# Patient Record
(demographics unavailable — no encounter records)

---

## 2025-06-04 NOTE — HISTORY OF PRESENT ILLNESS
[Currently Active] : currently active [Men] : men [TextBox_4] : Verónica is a 20 y/o G0 who presents today for an annual exam with request for HARRIET refill She has been taking birth control pills x1 month but has not had a menses. LMP was 4/25/25. She is using Gabby with levonogestral and EE x21 days with no placebo pills or pill free weeks. She states these were prescribed by someone in [facility].  she denies any contraindications for HARRIET usage.  She states that prior to birth control she had regular menses.  In-office pregnancy test is negative today.  she is sexually active, not using condoms. She has c/o pain with intercourse for the past 2 weeks. she denies any increased vaginal odor or discharge.   She is not aware if she had Gardasil vaccination.    Acanthosis Nigricans noted to b/l axilla and inner thighs. Pt states she was previously told to loose weight 2nd to elevated HgA1c and has lost 60lbs and now exercises regularly.  Language line used for this encounter: Pretty #[id number] [FreeTextEntry1] : 4/25/25

## 2025-06-04 NOTE — PHYSICAL EXAM
[Appropriately responsive] : appropriately responsive [Alert] : alert [No Acute Distress] : no acute distress [No Lymphadenopathy] : no lymphadenopathy [Oriented x3] : oriented x3 [Examination Of The Breasts] : a normal appearance [No Discharge] : no discharge [No Masses] : no breast masses were palpable [Labia Majora] : normal [Labia Minora] : normal [Normal] : normal [Uterine Adnexae] : normal [FreeTextEntry2] : +acanthosis nigricans

## 2025-06-04 NOTE — DISCUSSION/SUMMARY
[FreeTextEntry1] : 1) pap/std cultures performed 2) pt counseled on HPV and benefits of vaccination. consistent condom usage advised 3) pt with no contraindications for HARRIET usage.risks vs. benefits reviewed along with optimal usage 4) pt counseled on diet and exercise 2nd to findings of acanthosis nigricans; already following these recommendations and has had weight loss  REturn to office in 6 months for surveillance